# Patient Record
Sex: FEMALE
[De-identification: names, ages, dates, MRNs, and addresses within clinical notes are randomized per-mention and may not be internally consistent; named-entity substitution may affect disease eponyms.]

---

## 2019-07-04 ENCOUNTER — HOSPITAL ENCOUNTER (EMERGENCY)
Dept: HOSPITAL 92 - ERS | Age: 79
Discharge: ANOTHER HOSPITAL | End: 2019-07-04
Payer: MEDICARE

## 2019-07-04 DIAGNOSIS — R41.82: ICD-10-CM

## 2019-07-04 DIAGNOSIS — Z79.4: ICD-10-CM

## 2019-07-04 DIAGNOSIS — R79.89: ICD-10-CM

## 2019-07-04 DIAGNOSIS — G45.9: ICD-10-CM

## 2019-07-04 DIAGNOSIS — E11.9: ICD-10-CM

## 2019-07-04 DIAGNOSIS — I50.9: Primary | ICD-10-CM

## 2019-07-04 DIAGNOSIS — Z79.899: ICD-10-CM

## 2019-07-04 DIAGNOSIS — N17.9: ICD-10-CM

## 2019-07-04 DIAGNOSIS — Z87.891: ICD-10-CM

## 2019-07-04 LAB
ALBUMIN SERPL BCG-MCNC: 3.3 G/DL (ref 3.4–4.8)
ALP SERPL-CCNC: 91 U/L (ref 40–150)
ALT SERPL W P-5'-P-CCNC: 12 U/L (ref 8–55)
ANALYZER IN CARDIO: (no result)
ANION GAP SERPL CALC-SCNC: 17 MMOL/L (ref 10–20)
APTT PPP: 34.8 SEC (ref 22.9–36.1)
AST SERPL-CCNC: 21 U/L (ref 5–34)
BACTERIA UR QL AUTO: (no result) HPF
BASE EXCESS STD BLDA CALC-SCNC: 8.5 MEQ/L
BASOPHILS # BLD AUTO: 0 THOU/UL (ref 0–0.2)
BASOPHILS NFR BLD AUTO: 0.3 % (ref 0–1)
BILIRUB SERPL-MCNC: 0.4 MG/DL (ref 0.2–1.2)
BUN SERPL-MCNC: 72 MG/DL (ref 9.8–20.1)
CA-I BLDA-SCNC: 1.16 MMOL/L (ref 1.12–1.3)
CALCIUM SERPL-MCNC: 8.8 MG/DL (ref 7.8–10.44)
CHLORIDE SERPL-SCNC: 95 MMOL/L (ref 98–107)
CK MB SERPL-MCNC: 5.6 NG/ML (ref 0–6.6)
CK SERPL-CCNC: 36 U/L (ref 29–168)
CO2 SERPL-SCNC: 34 MMOL/L (ref 23–31)
CREAT CL PREDICTED SERPL C-G-VRATE: 0 ML/MIN (ref 70–130)
EOSINOPHIL # BLD AUTO: 0.1 THOU/UL (ref 0–0.7)
EOSINOPHIL NFR BLD AUTO: 1.3 % (ref 0–10)
GLOBULIN SER CALC-MCNC: 3.2 G/DL (ref 2.4–3.5)
GLUCOSE SERPL-MCNC: 114 MG/DL (ref 83–110)
HCO3 BLDA-SCNC: 39 MEQ/L (ref 22–28)
HCT VFR BLDA CALC: 29 % (ref 36–47)
HGB BLD-MCNC: 9.3 G/DL (ref 12–16)
HGB BLDA-MCNC: 10 G/DL (ref 12–16)
INR PPP: 1.1
LEUKOCYTE ESTERASE UR QL STRIP.AUTO: 250 LEU/UL
LYMPHOCYTES # BLD: 0.9 THOU/UL (ref 1.2–3.4)
LYMPHOCYTES NFR BLD AUTO: 14.5 % (ref 21–51)
MAGNESIUM SERPL-MCNC: 3 MG/DL (ref 1.6–2.6)
MCH RBC QN AUTO: 28.4 PG (ref 27–31)
MCV RBC AUTO: 96.3 FL (ref 78–98)
MONOCYTES # BLD AUTO: 0.6 THOU/UL (ref 0.11–0.59)
MONOCYTES NFR BLD AUTO: 10 % (ref 0–10)
NEUTROPHILS # BLD AUTO: 4.5 THOU/UL (ref 1.4–6.5)
NEUTROPHILS NFR BLD AUTO: 73.9 % (ref 42–75)
PCO2 BLDA: 102.2 MMHG (ref 35–45)
PH BLDA: 7.2 [PH] (ref 7.35–7.45)
PLATELET # BLD AUTO: 164 THOU/UL (ref 130–400)
PO2 BLDA: 359.3 MMHG (ref 60–?)
POTASSIUM BLD-SCNC: 5.26 MMOL/L (ref 3.7–5.3)
POTASSIUM SERPL-SCNC: 5 MMOL/L (ref 3.5–5.1)
PROTHROMBIN TIME: 14.5 SEC (ref 12–14.7)
RBC # BLD AUTO: 3.27 MILL/UL (ref 4.2–5.4)
RBC UR QL AUTO: (no result) HPF (ref 0–3)
SODIUM SERPL-SCNC: 141 MMOL/L (ref 136–145)
SPECIMEN DRAWN FROM PATIENT: (no result)
TROPONIN I SERPL DL<=0.01 NG/ML-MCNC: 0.02 NG/ML (ref ?–0.03)
TROPONIN I SERPL DL<=0.01 NG/ML-MCNC: 0.03 NG/ML (ref ?–0.03)
WBC # BLD AUTO: 6.1 THOU/UL (ref 4.8–10.8)

## 2019-07-04 PROCEDURE — 36416 COLLJ CAPILLARY BLOOD SPEC: CPT

## 2019-07-04 PROCEDURE — 96376 TX/PRO/DX INJ SAME DRUG ADON: CPT

## 2019-07-04 PROCEDURE — 81015 MICROSCOPIC EXAM OF URINE: CPT

## 2019-07-04 PROCEDURE — 71045 X-RAY EXAM CHEST 1 VIEW: CPT

## 2019-07-04 PROCEDURE — 85730 THROMBOPLASTIN TIME PARTIAL: CPT

## 2019-07-04 PROCEDURE — 93005 ELECTROCARDIOGRAM TRACING: CPT

## 2019-07-04 PROCEDURE — 82553 CREATINE MB FRACTION: CPT

## 2019-07-04 PROCEDURE — 81003 URINALYSIS AUTO W/O SCOPE: CPT

## 2019-07-04 PROCEDURE — 80053 COMPREHEN METABOLIC PANEL: CPT

## 2019-07-04 PROCEDURE — 82550 ASSAY OF CK (CPK): CPT

## 2019-07-04 PROCEDURE — 85025 COMPLETE CBC W/AUTO DIFF WBC: CPT

## 2019-07-04 PROCEDURE — 82805 BLOOD GASES W/O2 SATURATION: CPT

## 2019-07-04 PROCEDURE — 84484 ASSAY OF TROPONIN QUANT: CPT

## 2019-07-04 PROCEDURE — 96365 THER/PROPH/DIAG IV INF INIT: CPT

## 2019-07-04 PROCEDURE — 85610 PROTHROMBIN TIME: CPT

## 2019-07-04 PROCEDURE — 83735 ASSAY OF MAGNESIUM: CPT

## 2019-07-04 PROCEDURE — 99292 CRITICAL CARE ADDL 30 MIN: CPT

## 2019-07-04 PROCEDURE — 96375 TX/PRO/DX INJ NEW DRUG ADDON: CPT

## 2019-07-04 PROCEDURE — 70450 CT HEAD/BRAIN W/O DYE: CPT

## 2019-07-04 PROCEDURE — 36415 COLL VENOUS BLD VENIPUNCTURE: CPT

## 2019-07-04 PROCEDURE — 83880 ASSAY OF NATRIURETIC PEPTIDE: CPT

## 2019-07-04 PROCEDURE — 94660 CPAP INITIATION&MGMT: CPT

## 2019-07-04 NOTE — RAD
RADIOGRAPH CHEST 1 VIEW:



DATE:

7/4/2019



TIME:

3:48 PM



HISTORY:

79-year-old female with dyspnea



COMPARISON:

12/12/2010



FINDINGS:

New dual lead left subclavian pacemaker. New left pleural effusion and airspace density at left base.
 Haziness of bilateral lower lung zones. No pneumothorax.



IMPRESSION:



1. Left pleural effusion.

2. Cardiomegaly.

3. Pulmonary edema versus atelectasis versus pneumonia at bilateral lung bases.

4. Pacemaker.



Reported By: Nic Otto 

Electronically Signed:  7/4/2019 4:07 PM

## 2019-07-04 NOTE — CT
CT BRAIN NONCONTRAST:



DATE:

7/4/2019



HISTORY:

79-year-old female undergoing acute stroke. Dysarthria and somnolence. Right-sided facial droop.



Dr. Otto verbally gave this level 2 stroke alert protocol report to Dr. Temple of the emergency Depar
tment at 3:32 PM 7/4/2019



FINDINGS:

There is no evidence of acute intra-axial or extra-axial hemorrhage. There is no midline shift or any
 other mass effect. There is no extra-axial fluid collection. There is no evidence of obstructive

hydrocephalus. Calvarium is intact. There is diffuse brain parenchymal volume loss. There are low att
enuation areas in the white matter. These are nonspecific, but in a patient of this age, they are

probably chronic ischemic white matter changes due to microvascular atherosclerosis. There is an asym
metric moderate size region of such low attenuation involving the left centrum semiovale and upper

corona radiata, inseparable from the chronic ischemic white matter changes. It is uncertain whether t
his represents asymmetric component of chronic ischemic white matter changes or edema.



IMPRESSION:

1) No definitive acute intracranial findings.

2) involutional changes and chronic ischemic white matter changes.

3) region of low attenuation the left cerebral deep white matter. Uncertain whether this is an asymme
trical portion of the chronic ischemic white matter changes or whether this represents a region of

edema. The former is slightly favored.



Reported By: Nic Otto 

Electronically Signed:  7/4/2019 3:34 PM

## 2019-07-04 NOTE — ULT
Right upper extremity venous ultrasound with Doppler



HISTORY: Evaluate for thrombus. Swelling



COMPARISON: None



TECHNIQUE: Grayscale, color flow, Doppler imaging and spectral wave form analysis performed of the [facility] upper extremity venous system



FINDINGS: There is patency, compressibility and flow in the in internal jugular vein. Subclavian vein
 is patent. Axillary vein compresses and has flow. Brachial vein, cephalic vein are patent.

Proximal basilic vein is patent. The distal basilic vein is not appreciated. Limited evaluation due t
o soft tissue edema. Radial vein and ulnar vein are patent



IMPRESSION: Limited evaluation of the distal basilic vein. The visualized right upper extremity venou
s system is patent.



Reported By: Fina Felton 

Electronically Signed:  7/4/2019 5:13 PM

## 2019-07-06 NOTE — EKG
Test Reason : 

Blood Pressure : ***/*** mmHG

Vent. Rate : 055 BPM     Atrial Rate : 267 BPM

   P-R Int : 000 ms          QRS Dur : 180 ms

    QT Int : 538 ms       P-R-T Axes : 000 -32 -02 degrees

   QTc Int : 514 ms

 

Demand pacemaker;  interpretation is based on intrinsic rhythm

Atrial fibrillation with slow ventricular response with premature ventricular or aberrantly conducted
 complexes

Left axis deviation

Right bundle branch block

Abnormal ECG

 

Confirmed by JUAN SCHULTZ ([id number]),  DANICA RUBIO ([id number]) on 7/6/2019 10:09:23 PM

 

Referred By:  MARION           Confirmed By:JUAN SCHULTZ

## 2019-07-16 ENCOUNTER — HOSPITAL ENCOUNTER (INPATIENT)
Dept: HOSPITAL 92 - ERS | Age: 79
DRG: 283 | End: 2019-07-16
Attending: INTERNAL MEDICINE | Admitting: INTERNAL MEDICINE
Payer: MEDICARE

## 2019-07-16 DIAGNOSIS — N18.4: ICD-10-CM

## 2019-07-16 DIAGNOSIS — Z98.51: ICD-10-CM

## 2019-07-16 DIAGNOSIS — J96.01: ICD-10-CM

## 2019-07-16 DIAGNOSIS — N17.9: ICD-10-CM

## 2019-07-16 DIAGNOSIS — E11.22: ICD-10-CM

## 2019-07-16 DIAGNOSIS — I21.A1: ICD-10-CM

## 2019-07-16 DIAGNOSIS — Z96.652: ICD-10-CM

## 2019-07-16 DIAGNOSIS — D63.1: ICD-10-CM

## 2019-07-16 DIAGNOSIS — E03.9: ICD-10-CM

## 2019-07-16 DIAGNOSIS — Z98.41: ICD-10-CM

## 2019-07-16 DIAGNOSIS — Z98.42: ICD-10-CM

## 2019-07-16 DIAGNOSIS — I13.0: Primary | ICD-10-CM

## 2019-07-16 DIAGNOSIS — Z95.0: ICD-10-CM

## 2019-07-16 DIAGNOSIS — I50.43: ICD-10-CM

## 2019-07-16 DIAGNOSIS — J96.02: ICD-10-CM

## 2019-07-16 DIAGNOSIS — Z95.5: ICD-10-CM

## 2019-07-16 DIAGNOSIS — E78.5: ICD-10-CM

## 2019-07-16 DIAGNOSIS — Z79.01: ICD-10-CM

## 2019-07-16 DIAGNOSIS — Z66: ICD-10-CM

## 2019-07-16 DIAGNOSIS — I48.0: ICD-10-CM

## 2019-07-16 DIAGNOSIS — Z87.891: ICD-10-CM

## 2019-07-16 DIAGNOSIS — I73.9: ICD-10-CM

## 2019-07-16 DIAGNOSIS — Z79.4: ICD-10-CM

## 2019-07-16 DIAGNOSIS — G92: ICD-10-CM

## 2019-07-16 DIAGNOSIS — I25.10: ICD-10-CM

## 2019-07-16 DIAGNOSIS — Z90.49: ICD-10-CM

## 2019-07-16 LAB
ALBUMIN SERPL BCG-MCNC: 3.4 G/DL (ref 3.4–4.8)
ALP SERPL-CCNC: 97 U/L (ref 40–150)
ALT SERPL W P-5'-P-CCNC: 14 U/L (ref 8–55)
ANALYZER IN CARDIO: (no result)
ANION GAP SERPL CALC-SCNC: 17 MMOL/L (ref 10–20)
AST SERPL-CCNC: 26 U/L (ref 5–34)
BACTERIA UR QL AUTO: (no result) HPF
BASE EXCESS STD BLDA CALC-SCNC: 18.8 MEQ/L
BASOPHILS # BLD AUTO: 0 THOU/UL (ref 0–0.2)
BASOPHILS NFR BLD AUTO: 0.2 % (ref 0–1)
BILIRUB SERPL-MCNC: 0.5 MG/DL (ref 0.2–1.2)
BUN SERPL-MCNC: 80 MG/DL (ref 9.8–20.1)
CA-I BLDA-SCNC: 1.11 MMOL/L (ref 1.12–1.3)
CALCIUM SERPL-MCNC: 8.8 MG/DL (ref 7.8–10.44)
CHLORIDE SERPL-SCNC: 90 MMOL/L (ref 98–107)
CK MB SERPL-MCNC: 4.2 NG/ML (ref 0–6.6)
CO2 SERPL-SCNC: 33 MMOL/L (ref 23–31)
CREAT CL PREDICTED SERPL C-G-VRATE: 0 ML/MIN (ref 70–130)
EOSINOPHIL # BLD AUTO: 0 THOU/UL (ref 0–0.7)
EOSINOPHIL NFR BLD AUTO: 0.5 % (ref 0–10)
GLOBULIN SER CALC-MCNC: 3.5 G/DL (ref 2.4–3.5)
GLUCOSE SERPL-MCNC: 143 MG/DL (ref 83–110)
HCO3 BLDA-SCNC: 50.9 MEQ/L (ref 22–28)
HCT VFR BLDA CALC: 28 % (ref 36–47)
HGB BLD-MCNC: 8.8 G/DL (ref 12–16)
HGB BLDA-MCNC: 9.6 G/DL (ref 12–16)
LYMPHOCYTES # BLD: 1 THOU/UL (ref 1.2–3.4)
LYMPHOCYTES NFR BLD AUTO: 13.4 % (ref 21–51)
MCH RBC QN AUTO: 28.4 PG (ref 27–31)
MCV RBC AUTO: 95 FL (ref 78–98)
MDIFF COMPLETE?: YES
MONOCYTES # BLD AUTO: 0.6 THOU/UL (ref 0.11–0.59)
MONOCYTES NFR BLD AUTO: 8.2 % (ref 0–10)
NEUTROPHILS # BLD AUTO: 5.7 THOU/UL (ref 1.4–6.5)
NEUTROPHILS NFR BLD AUTO: 77.7 % (ref 42–75)
PCO2 BLDA: 134.8 MMHG (ref 35–45)
PH BLDA: 7.2 [PH] (ref 7.35–7.45)
PLATELET # BLD AUTO: 142 THOU/UL (ref 130–400)
PO2 BLDA: 64.1 MMHG (ref 60–?)
POLYCHROMASIA BLD QL SMEAR: (no result) (100X)
POTASSIUM BLD-SCNC: 4.44 MMOL/L (ref 3.7–5.3)
POTASSIUM SERPL-SCNC: 4.5 MMOL/L (ref 3.5–5.1)
PROT UR STRIP.AUTO-MCNC: 10 MG/DL
RBC # BLD AUTO: 3.1 MILL/UL (ref 4.2–5.4)
RBC UR QL AUTO: (no result) HPF (ref 0–3)
SODIUM SERPL-SCNC: 138 MMOL/L (ref 136–145)
SPECIMEN DRAWN FROM PATIENT: (no result)
TROPONIN I SERPL DL<=0.01 NG/ML-MCNC: 0.01 NG/ML (ref ?–0.03)
WBC # BLD AUTO: 7.3 THOU/UL (ref 4.8–10.8)
WBC UR QL AUTO: (no result) HPF (ref 0–3)

## 2019-07-16 PROCEDURE — 93005 ELECTROCARDIOGRAM TRACING: CPT

## 2019-07-16 PROCEDURE — 81015 MICROSCOPIC EXAM OF URINE: CPT

## 2019-07-16 PROCEDURE — 80053 COMPREHEN METABOLIC PANEL: CPT

## 2019-07-16 PROCEDURE — 36415 COLL VENOUS BLD VENIPUNCTURE: CPT

## 2019-07-16 PROCEDURE — 84484 ASSAY OF TROPONIN QUANT: CPT

## 2019-07-16 PROCEDURE — 82805 BLOOD GASES W/O2 SATURATION: CPT

## 2019-07-16 PROCEDURE — 85025 COMPLETE CBC W/AUTO DIFF WBC: CPT

## 2019-07-16 PROCEDURE — 82553 CREATINE MB FRACTION: CPT

## 2019-07-16 PROCEDURE — A4353 INTERMITTENT URINARY CATH: HCPCS

## 2019-07-16 PROCEDURE — 81003 URINALYSIS AUTO W/O SCOPE: CPT

## 2019-07-16 PROCEDURE — 94660 CPAP INITIATION&MGMT: CPT

## 2019-07-16 PROCEDURE — 71045 X-RAY EXAM CHEST 1 VIEW: CPT

## 2019-07-16 NOTE — HP
PRIMARY CARE PHYSICIAN:  Oscar Mckenna MD



PRIMARY CARDIOLOGIST:  Dr. Jensen.



CHIEF COMPLAINT:  Altered mentation.



HISTORY OF PRESENT ILLNESS:  The patient is an 79-year-old  female with

acute systolic and diastolic heart failure; coronary artery disease; diabetes

mellitus, type 2; hypertension; peripheral vascular disease; and CKD, presented 
to

the hospital from [facility] with above complaints. 



The patient was discharged from LTAC, located within [facility] yesterday after a

10-day stay for congestive heart failure exacerbation.  The patient was 
evaluated by

Cardiology, Dr. Jensen, as well as Nephrology, Dr. Álvarez/Ga.  She continued 
to

get worsen.  Hospice was recommended.  The family is in the process of applying 
for

Medicaid for hospice at [facility].  This morning, she was found to have altered

mentation along with lethargy.  She was sent to this facility for hospital

admission.  Initial O2 saturation was 77% on nasal cannula.  She was started on

noninvasive positive-pressure ventilation.  At this time, the patient is

unresponsive and not following any commands.  History obtained from the family 
at

the bedside. 



PAST MEDICAL HISTORY:  

1. Coronary artery disease.

2. Acute systolic and diastolic heart failure with recent exacerbation.

3. Peripheral vascular disease.

4. Hypertension.

5. Hyperlipidemia.

6. Hypothyroidism.

7. Peripheral vascular disease.

8. Chronic kidney disease, stage 4.



PAST SURGICAL HISTORY:  

1. Pacemaker placement.

2. Coronary stent placement.

3. Peripheral vascular stent placement.

4. Cholecystectomy.

5. .

6. Tubal ligation.

7. Left knee replacement.

8. Bilateral cataract surgeries.



ALLERGIES:  NO KNOWN DRUG ALLERGIES.



CURRENT MEDICATIONS:  At the nursing home,

1. Hydralazine 100 mg 3 times daily.

2. Loratadine 10 mg daily.

3. Amiodarone 200 mg twice a day.

4. Carvedilol 3.125 mg b.i.d.

5. Colace 100 mg b.i.d.

6. Pepcid 20 mg daily.

7. Folic acid 1 mg daily.

8. Lasix 40 mg daily.

9. Januvia 25 mg daily.

10. Humalog sliding scale.

11. Eliquis 2.5 mg b.i.d.

12. Plavix 75 mg daily.

13. Glimepiride 2 mg daily.

14. Levothyroxine 75 mcg daily.

15. MiraLAX 17 g daily.

16. Magnesium oxide 400 mg daily.

17. Potassium chloride ER 10 mEq daily.

18. Vitamin B12, 1000 mcg daily.

19. Vitamin D3, 5000 units daily.



SOCIAL HISTORY:  The patient currently lives at [facility] under the

care of Dr. Terry.  She is a former smoker.  She is a DNR.  This was confirmed 
with

daughter, Yanira, at the bedside.  Yanira is the DPOA. 



FAMILY HISTORY:  Negative for premature coronary artery disease.



REVIEW OF SYSTEMS:  Cannot be reliably obtained from the patient due to current

cognitive status. 



PHYSICAL EXAMINATION:

VITAL SIGNS:  The patient's temperature 95.4 rectally with respirations of 29, 
pulse

of 62, blood pressure of 179/67 with O2 saturation of 85% on BiPAP. 

GENERAL:  An 79-year-old female, unresponsive. 

HEENT:  Head, atraumatic and normocephalic.  Sclerae anicteric. 

NECK:  Supple.  No carotid bruit.  JVD elevated. 

LUNGS:  Showed diffuse rhonchi with bibasilar rales.  There is accessory muscle 
use.

 No significant wheezing. 

HEART:  S1 and S2 present.  Regular rate and rhythm.  3/6 systolic murmur over 
the

mitral area. 

ABDOMEN:  Soft.  Bowel sounds present.  No rebound or guarding. 

EXTREMITIES:  4+ edema in bilateral lower extremities. 

NEUROLOGIC AND PSYCHIATRY:  Could not be reliably done due to current cognitive

status. 

SKIN:  Warm and dry. 

LYMPH NODES:  No palpable lymph nodes in the neck.



LABORATORY DATA:  Lab findings; creatinine 2.63, BUN of 80, sodium 138, 
potassium

4.5, chloride 90, and bicarb 33.  WBC 7.3 with hemoglobin 8.8, and platelet 
count of

142.  Urinalysis was negative for wbc. 



ABG showed pH 7.2 with pCO2 of 135, bicarbonate of 51, PO2 of 64.1. 



Chest x-ray by my review showed cardiomegaly with pulmonary edema. 



EKG by my review showed paced rhythm.



IMPRESSION:  

1. Acute hypoxic and hypercapnic respiratory failure, secondary to  Acute on 
chronic systolic and diastolic heart failure

exacerbation. 

2. Recent 10-day stay at LTAC, located [facility] for congestive heart 
failure

exacerbation. 

3. Toxic metabolic encephalopathy, secondary to respiratory failure.

4. Acute kidney injury on chronic kidney disease, stage 4.

5. Type 2 myocardial infarction.

6. Chronic anemia.

7. Former smoker.

8. Hypothyroidism.

9. Diabetes mellitus, type 2.

10. Hyperlipidemia.

11. Paroxysmal atrial fibrillation, on anticoagulation.

12. Chronic anticoagulation.

13. Peripheral vascular disease, status post stent placement.

14. Coronary artery disease.



PLAN:  The patient is currently on noninvasive positive-pressure ventilation 
while

in the emergency room.  Dr. Quiles has evaluated the patient.  The family was 
trying

to get hospice at the nursing facility.  After an extensive discussion with the

family, they agreed to discontinue noninvasive positive-pressure ventilation at 
this

time.  They want to make her comfortable.  DNR was verified.  We will admit her 
on

the medical floor for symptom management/comfort care.  We will start IV 
morphine

and Ativan p.r.n.  Palliative Care Team has been notified.  We will consult case

manager for hospice setup.  Spiritual Care will be consulted.  Plan was 
discussed

with the family in detail.  They stated understanding. 







Job ID:  [id number]



MTDD

## 2019-07-16 NOTE — RAD
UPRIGHT PORTABLE CHEST 1 VIEW:

 

HISTORY: 

Altered mental status, fluid retention.

 

COMPARISON: 

7/4/2019.

 

FINDINGS: 

Left ICD.  Cardiomegaly.  Status post TAVR.  Bilateral vascular congestion, interstitial and alveolar
 edema, and pleural effusions, evidence for some congestive heart failure.  The amount of edema appea
rs to be minimally more prominent than the prior study.

 

IMPRESSION: 

Evidence for slight worsening edema and congestive heart failure from prior study.

 

POS: OFF

## 2019-07-16 NOTE — CON
DATE OF CONSULTATION:  



HISTORY OF PRESENT ILLNESS:  Елена Klein is an 79-year-old female
,

encephalopathic in the ER. History obtained from a caretaker, who is daughter 
and

niece.  She was just recently discharged from a peripheral hospital with a 
diagnosis

of end-stage cardiomyopathy, respiratory failure, COPD, morbid obesity, severe

deconditioning.  She was in the nursing home and presented with altered mental

status, worsening symptoms. 



She has severe respiratory acidosis.  She is a DNR, not to be intubated.  I

discussed with the family at length that a prognosis is grave.would dc_ BiPAP

off put on a non-rebreather.  Transfer to inpatient for comfort care.  They are

agreeable. 



Additional information from family members,



PAST MEDICAL HISTORY:  Longstanding history of diabetes, hypertension, CHF, COPD
,

probably sleep apnea. 



PREVIOUS SURGERIES:  Pacemaker, cardiac stent and leg stents, previous

cholecystectomy, tubal ligation, left knee, cataract. 



SOCIAL HISTORY:  

Alcohol none, smoker, former.  Spoke to the family, has severe limitation to

activity for a long time. 



She had a recent CT done of her head on 07/04/2019, which is negative.  She had 
a

chest x-ray taken prior to this admission about a week ago which also showed

persistent cardiomegaly, bilateral pleural effusion, left greater than right 
with a

pacemaker in place. 



HOME MEDICATIONS:  Unknown.



REVIEW OF SYSTEMS:  Unobtainable.



PHYSICAL EXAMINATION:

GENERAL: The patient is obtunded, unresponsive. 

VITAL SIGNS:  Sats 98%.  Blood pressure 100/80, pulse 100 resp35 

LUNGS:  Extensive rhonchi and crackles. 

ABDOMEN:  Soft. 



Lytes, creatinine is 2.6, BUN is 80.  Urine is negative. 



Chest x-ray, CHF.  PO2 64, pCO2 134, pH of 7.20 on BiPAP, 70% FiO2.  White count

__________, hemoglobin 8, hematocrit 29, platelet count 142.  Multiorgan 
failure. 



IMPRESSION:  

1. Respiratory failure, marked respiratory acidosis.  

2. Congestive heart failure, possibly superimposed pneumonia.

3. Baseline probably chronic obstructive pulmonary disease, former smoker for 
renal

failure. 



As per family members comfort care, inpatient hospice if she survives the next 
24 to

48 hours.  Morphine, Ativan. 



70 minutes, 50% direct patient care.







Job ID:  [id number]



[facility]

## 2019-07-17 NOTE — DIS
DATE OF ADMISSION:  2019



DATE OF DISCHARGE:  2019



DEATH SUMMARY:  



DATE OF EXPIRATION:  .



BRIEF HOSPITAL COURSE:  The patient was an 79-year-old female with congestive heart

failure and coronary artery disease, who was discharged from [facility] yesterday to [place], was brought into the hospital with altered mentation.

 Please refer to the history and physical dictated by me earlier today for details. 



The patient was admitted to the hospital with a diagnosis of acute hypoxic and

hypercapnic respiratory failure secondary to acute on chronic systolic/diastolic

heart failure exacerbation.  ABGs showed pH of 7.2 with pCO2 of 134.8, PO2 of 64.1,

and bicarbonate of 51.  She was initially started on noninvasive positive-pressure

ventilation.  Pulmonologist, Dr. Quiles was consulted.  The family decided on comfort

measures only.  Noninvasive positive-pressure ventilation was discontinued per

family request.  The patient  in the emergency room on  at

1:24 p.m.  The family was at the bedside. 



FINAL DIAGNOSES:  

1. Acute hypoxic and hypercapnic respiratory failure secondary to congestive heart

failure exacerbation. 

2. Recent 10-day stay at [facility] for her congestive heart

failure exacerbation. 

3. Acute on chronic systolic/diastolic heart failure.

4. Toxic metabolic encephalopathy secondary to respiratory failure/hypercapnia.

5. Acute kidney injury on chronic kidney disease stage 4.

6. Coronary artery disease with type 2 myocardial infarction.

7. Peripheral vascular disease.

8. Paroxysmal atrial fibrillation, on anticoagulation.

9. Diabetes mellitus, type 2.

10. Hypertension.

11. Hyperlipidemia.

12. Hypothyroidism.

13. Chronic anticoagulation.

14. Do not resuscitate.

15. Chronic anemia.







Job ID:  [id number]

## 2019-07-20 NOTE — EKG
Test Reason : ER

Blood Pressure : ***/*** mmHG

Vent. Rate : 052 BPM     Atrial Rate : 027 BPM

   P-R Int : 000 ms          QRS Dur : 190 ms

    QT Int : 572 ms       P-R-T Axes : 000 -48 024 degrees

   QTc Int : 531 ms

 

Wide QRS rhythm with frequent ventricular-paced complexes

Left axis deviation

Right bundle branch block

Abnormal ECG

 

Confirmed by ELVER PERRY DO ([id number]),  NIKIA PACE ([id number]) on 7/20/2019 3:21:19 PM

 

Referred By:             Confirmed By:ELVER PERRY DO